# Patient Record
Sex: MALE | Race: WHITE | Employment: PART TIME | URBAN - METROPOLITAN AREA
[De-identification: names, ages, dates, MRNs, and addresses within clinical notes are randomized per-mention and may not be internally consistent; named-entity substitution may affect disease eponyms.]

---

## 2020-01-26 ENCOUNTER — OFFICE VISIT (OUTPATIENT)
Dept: FAMILY MEDICINE CLINIC | Facility: CLINIC | Age: 60
End: 2020-01-26

## 2020-01-26 VITALS
OXYGEN SATURATION: 97 % | DIASTOLIC BLOOD PRESSURE: 82 MMHG | HEIGHT: 78 IN | SYSTOLIC BLOOD PRESSURE: 117 MMHG | WEIGHT: 268 LBS | TEMPERATURE: 98 F | HEART RATE: 93 BPM | BODY MASS INDEX: 31.01 KG/M2 | RESPIRATION RATE: 18 BRPM

## 2020-01-26 DIAGNOSIS — R05.8 SPASMODIC COUGH: ICD-10-CM

## 2020-01-26 DIAGNOSIS — J22 LOWER RESPIRATORY INFECTION: Primary | ICD-10-CM

## 2020-01-26 PROCEDURE — 99202 OFFICE O/P NEW SF 15 MIN: CPT | Performed by: PHYSICIAN ASSISTANT

## 2020-01-26 RX ORDER — PREDNISONE 20 MG/1
20 TABLET ORAL 2 TIMES DAILY
Qty: 10 TABLET | Refills: 0 | Status: SHIPPED | OUTPATIENT
Start: 2020-01-26 | End: 2020-01-31

## 2020-01-26 RX ORDER — ALBUTEROL SULFATE 90 UG/1
1-2 AEROSOL, METERED RESPIRATORY (INHALATION) EVERY 6 HOURS PRN
Qty: 1 INHALER | Refills: 0 | Status: SHIPPED | OUTPATIENT
Start: 2020-01-26

## 2020-01-26 RX ORDER — AZITHROMYCIN 250 MG/1
TABLET, FILM COATED ORAL
Qty: 6 TABLET | Refills: 0 | Status: SHIPPED | OUTPATIENT
Start: 2020-01-26

## 2020-01-26 RX ORDER — BENZONATATE 200 MG/1
200 CAPSULE ORAL 3 TIMES DAILY PRN
Qty: 30 CAPSULE | Refills: 0 | Status: SHIPPED | OUTPATIENT
Start: 2020-01-26 | End: 2020-02-05

## 2020-01-26 NOTE — PATIENT INSTRUCTIONS
-Push fluids  -Cool mist humidifier  -mucinex  -Must be seen with worsening symptoms      Bronchitis, Antibiotic Treatment (Adult)    Bronchitis is an infection of the air passages (bronchial tubes) in your lungs. It often occurs when you have a cold.  This Do this even if you are feeling better after only a few days. Follow-up care  Follow up with your healthcare provider, or as advised. If you had an X-ray or ECG (electrocardiogram), a specialist will review it.  You will be told of any new test results ria

## 2020-01-26 NOTE — PROGRESS NOTES
CHIEF COMPLAINT:   Patient presents with:  Cough    HPI:   Jenny Renee is a 61year old male who presents for URI symptoms for  2 weeks and symptoms have been worsening over the past 4 days.   Patient reports respiratory congestion, cough-productive, havi NEURO: Denies headaches    EXAM:   /82   Pulse 93   Temp 98.3 °F (36.8 °C) (Oral)   Resp 18   Ht 79\"   Wt 268 lb (121.6 kg)   SpO2 97%   BMI 30.19 kg/m²   GENERAL: well developed, well nourished,in no apparent distress  SKIN: no rashes,no suspicious Sig: Take 1 capsule (200 mg total) by mouth 3 (three) times daily as needed for cough.    • Albuterol Sulfate HFA (PROAIR HFA) 108 (90 Base) MCG/ACT Inhalation Aero Soln 1 Inhaler 0     Sig: Inhale 1-2 puffs into the lungs every 6 (six) hours as needed fo · Your appetite may be low, so a light diet is fine. Stay well hydrated by drinking 6 to 8 glasses of fluids per day. This includes water, soft drinks, sports drinks, juices, tea, or soup. Extra fluids will help loosen mucus in your nose and lungs.   · Over

## 2020-08-02 ENCOUNTER — HOSPITAL ENCOUNTER (OUTPATIENT)
Age: 60
Discharge: NURSING, REHAB OR LONG-TERM CARE | End: 2020-08-02

## 2020-08-02 VITALS
RESPIRATION RATE: 20 BRPM | HEART RATE: 89 BPM | DIASTOLIC BLOOD PRESSURE: 100 MMHG | SYSTOLIC BLOOD PRESSURE: 124 MMHG | OXYGEN SATURATION: 96 % | TEMPERATURE: 98 F

## 2020-08-02 DIAGNOSIS — M79.89 FOOT SWELLING: Primary | ICD-10-CM

## 2020-08-02 PROCEDURE — 99203 OFFICE O/P NEW LOW 30 MIN: CPT | Performed by: NURSE PRACTITIONER

## 2020-08-02 NOTE — ED PROVIDER NOTES
Patient Seen in: [facility]      History   Patient presents with:  Cough/URI  Lower Extremity Injury    Stated Complaint: swollen ankle, cough x 9days    44-year-old male with a history of osteoarthritis presents the immed Dorsalis pedis pulses are 1+ on the left side. Pulmonary:      Effort: Pulmonary effort is normal.      Breath sounds: Normal breath sounds. Abdominal:      General: Abdomen is flat. Bowel sounds are normal.      Palpations: Abdomen is soft.    Claudette Mesa

## 2020-08-02 NOTE — ED INITIAL ASSESSMENT (HPI)
Pt c/o cough for 3 days. Pt just had a covid test last week and it was negative. Pt also has pain and swelling to his right ankle. Pt denies any injury.